# Patient Record
Sex: FEMALE | Race: BLACK OR AFRICAN AMERICAN | Employment: FULL TIME | URBAN - METROPOLITAN AREA
[De-identification: names, ages, dates, MRNs, and addresses within clinical notes are randomized per-mention and may not be internally consistent; named-entity substitution may affect disease eponyms.]

---

## 2017-06-21 PROBLEM — I47.1 SVT (SUPRAVENTRICULAR TACHYCARDIA) (HCC): Status: ACTIVE | Noted: 2017-06-21

## 2017-06-26 ENCOUNTER — OFFICE VISIT (OUTPATIENT)
Dept: CARDIOLOGY CLINIC | Age: 27
End: 2017-06-26

## 2017-06-26 VITALS
OXYGEN SATURATION: 98 % | DIASTOLIC BLOOD PRESSURE: 60 MMHG | BODY MASS INDEX: 29.57 KG/M2 | HEART RATE: 100 BPM | WEIGHT: 184 LBS | SYSTOLIC BLOOD PRESSURE: 140 MMHG | HEIGHT: 66 IN

## 2017-06-26 DIAGNOSIS — I49.1 APC (ATRIAL PREMATURE CONTRACTIONS): ICD-10-CM

## 2017-06-26 DIAGNOSIS — I47.1 SVT (SUPRAVENTRICULAR TACHYCARDIA) (HCC): Primary | ICD-10-CM

## 2017-06-26 NOTE — MR AVS SNAPSHOT
Visit Information Date & Time Provider Department Dept. Phone Encounter #  
 6/26/2017  4:00 PM Vivian Hilliard MD [facility] [phone] Upcoming Health Maintenance Date Due  
 HPV AGE 9Y-34Y (1 of 3 - Female 3 Dose Series) 6/23/2002 Pneumococcal 19-64 Medium Risk (1 of 1 - PPSV23) 6/23/2010 DTaP/Tdap/Td series (1 - Tdap) 6/23/2012 PAP AKA CERVICAL CYTOLOGY 2/12/2017 OB 3RD TRIMESTER TDAP 6/14/2017 INFLUENZA AGE 9 TO ADULT 8/1/2017 Allergies as of 6/26/2017  Review Complete On: 6/21/2017 By: Sita Berrios Severity Noted Reaction Type Reactions Latex  03/07/2014    Rash Azithromycin High 03/07/2014   Systemic Shortness of Breath, Swelling, Angioedema Rocephin [Ceftriaxone] High 06/08/2017    Hives, Rash Peanut Medium 03/07/2014   Systemic Rash, Itching Shellfish Derived Medium 03/07/2014   Systemic Hives, Itching Current Immunizations  Never Reviewed No immunizations on file. Not reviewed this visit You Were Diagnosed With   
  
 Codes Comments SVT (supraventricular tachycardia) ([facility] [address]    -  Primary ICD-10-CM: I47.1 ICD-9-CM: 427.89   
 APC (atrial premature contractions)     ICD-10-CM: I49.1 ICD-9-CM: 427.61 Vitals BP Pulse Height(growth percentile) Weight(growth percentile) SpO2 BMI  
 140/60 100 5' 6\" (1.676 m) 184 lb (83.5 kg) 98% 29.7 kg/m2 OB Status Smoking Status Pregnant Former Smoker BMI and BSA Data Body Mass Index Body Surface Area  
 29.7 kg/m 2 1.97 m 2 Your Updated Medication List  
  
   
This list is accurate as of: 6/26/17  4:40 PM.  Always use your most recent med list.  
  
  
  
  
 acetaminophen 325 mg tablet Commonly known as:  TYLENOL Take 2 Tabs by mouth every four (4) hours as needed. nitrofurantoin (macrocrystal-monohydrate) 100 mg capsule Commonly known as:  MACROBID  
 Take 1 Cap by mouth two (2) times a day. PRENATAL (W/O VIT A)-FE FUM-FA PO Take  by mouth. VITAMIN D2 50,000 unit capsule Generic drug:  ergocalciferol Take 50,000 Units by mouth every seven (7) days. We Performed the Following AMB POC EKG ROUTINE W/ 12 LEADS, INTER & REP [56691 CPT(R)] Introducing [facility]! Ciara Cleverly introduces [employer] patient portal. Now you can access parts of your medical record, email your doctor's office, and request medication refills online. 1. In your internet browser, go to [email or web address] 2. Click on the First Time User? Click Here link in the Sign In box. You will see the New Member Sign Up page. 3. Enter your [employer] Access Code exactly as it appears below. You will not need to use this code after youve completed the sign-up process. If you do not sign up before the expiration date, you must request a new code. · [employer] Access Code: [email or web address] Expires: 9/6/2017  1:55 PM 
 
4. Enter the last four digits of your Social Security Number (xxxx) and Date of Birth (mm/dd/yyyy) as indicated and click Submit. You will be taken to the next sign-up page. 5. Create a [employer] ID. This will be your [employer] login ID and cannot be changed, so think of one that is secure and easy to remember. 6. Create a [employer] password. You can change your password at any time. 7. Enter your Password Reset Question and Answer. This can be used at a later time if you forget your password. 8. Enter your e-mail address. You will receive e-mail notification when new information is available in [address] 9. Click Sign Up. You can now view and download portions of your medical record. 10. Click the Download Summary menu link to download a portable copy of your medical information. If you have questions, please visit the Frequently Asked Questions section of the [employer] website.  Remember, [employer] is NOT to be used for urgent needs. For medical emergencies, dial 911. Now available from your iPhone and Android! Please provide this summary of care documentation to your next provider. Your primary care clinician is listed as JOEL LARIOS. If you have any questions after today's visit, please call [phone].

## 2017-06-26 NOTE — PROGRESS NOTES
HISTORY OF PRESENT ILLNESS  Stefany Park is a 33 y.o. female. Palpitations    Pertinent negatives include no fever, no chest pain, no claudication, no orthopnea, no PND, no abdominal pain, no nausea, no vomiting, no headaches, no dizziness, no cough and no shortness of breath. Patient presents for a post hospital visit. Patient has a past medical history significant for atrial premature contractions and a short NC interval and EKG. The patient underwent a cardiac workup for palpitations over 5 years ago including an echocardiogram and a Holter monitor. The Holter monitor showed frequent atrial premature contractions, but no prolonged arrhythmias. The echocardiogram was essentially normal with the exception of an aneurysmal intra-atrial septum. She then underwent further evaluation of her intra-atrial septum with a bubble study in November 2014 which demonstrated a very small patent foramen ovale. The patient was last seen in the office 8 months ago. She is now 28 weeks pregnant with her second pregnancy. She was hospitalized briefly last week at [facility] for sinus tachycardia and chest pain. She underwent negative PVLs, a  low risk VQ scan and underwent a normal echocardiogram.    Since hospital discharge, the patient still complains of intermittent palpitations. She has had no recurrent chest pain. No shortness of breath, no leg swelling. No severe dizzy spells, syncope or near syncope. Past Medical History:   Diagnosis Date    Asthma     Cardiac echocardiogram, abnormal 11/11/2014    EF 60-65%. No RWMA. Normal diastolic function. L-R septal bowing. Very sm L-R shunt induced by coughing.       Cervical cerclage suture present      in eric    Environmental allergies     Ill-defined condition     mur mur    PACs     heart murmur, whole in heart      Current Outpatient Prescriptions   Medication Sig Dispense Refill    acetaminophen (TYLENOL) 325 mg tablet Take 2 Tabs by mouth every four (4) hours as needed. 30 Tab 1    nitrofurantoin, macrocrystal-monohydrate, (MACROBID) 100 mg capsule Take 1 Cap by mouth two (2) times a day. 20 Cap 0    ergocalciferol (VITAMIN D2) 50,000 unit capsule Take 50,000 Units by mouth every seven (7) days.  PV W-O VIT A/FE FUMARATE/FA (PRENATAL, W/O VIT A,-FE FUM-FA PO) Take  by mouth. Allergies   Allergen Reactions    Latex Rash    Azithromycin Shortness of Breath, Swelling and Angioedema    Rocephin [Ceftriaxone] Hives and Rash    Peanut Rash and Itching    Shellfish Derived Hives and Itching      Social History   Substance Use Topics    Smoking status: Former Smoker     Packs/day: 0.20     Years: 5.00     Types: Cigarettes    Smokeless tobacco: Never Used    Alcohol use No            Review of Systems   Constitutional: Negative for chills, fever and weight loss. HENT: Negative for nosebleeds. Eyes: Negative for blurred vision and double vision. Respiratory: Negative for cough, shortness of breath and wheezing. Cardiovascular: Negative for chest pain, palpitations, orthopnea, claudication, leg swelling and PND. Gastrointestinal: Negative for abdominal pain, heartburn, nausea and vomiting. Genitourinary: Negative for dysuria and hematuria. Musculoskeletal: Negative for falls and myalgias. Skin: Negative for rash. Neurological: Negative for dizziness, focal weakness and headaches. Endo/Heme/Allergies: Does not bruise/bleed easily. Psychiatric/Behavioral: Negative for substance abuse. Visit Vitals    /60    Pulse 100    Ht 5' 6\" (1.676 m)    Wt 83.5 kg (184 lb)    SpO2 98%    BMI 29.7 kg/m2      Physical Exam   Constitutional: She is oriented to person, place, and time. She appears well-developed and well-nourished. HENT:   Head: Normocephalic and atraumatic. Eyes: Conjunctivae are normal.   Neck: Neck supple. No JVD present. Carotid bruit is not present. Cardiovascular: Normal rate, regular rhythm, S1 normal, S2 normal and normal pulses. Exam reveals no gallop. No murmur heard. Split S2   Pulmonary/Chest: Effort normal and breath sounds normal. She has no wheezes. She has no rales. Abdominal: Soft. Bowel sounds are normal. There is no tenderness. Musculoskeletal: She exhibits no edema. Neurological: She is alert and oriented to person, place, and time. Skin: Skin is warm and dry. EKG: Normal sinus rhythm, short SD interval, no delta waves, no ST or T wave abnormalities. No change compared to previous EKG    ASSESSMENT and PLAN    Palpitations. I suspect the patient was having physiologic sinus tachycardia related to her pregnancy. She denied any significant arrhythmias on telemetry during her recent hospital stay. Her EKG demonstrated sinus tachycardia. She underwent a low risk VQ scan, negative PVLs of her legs and an unremarkable echocardiogram.  No further cardiac workup needed for this. Aneurysmal interatrial septum with tiny patent foramen ovale. This was identified by echocardiogram in November 2014. This is an incidental finding and does not portend a worsening prognosis. Antibiotic prophylaxis is not needed for this congenital condition. This should not affect her pregnancy. Atypical chest pain. This is likely musculoskeletal in nature. She is at very low risk for ischemic heart disease. Patient can follow-up in 6 months, sooner if needed.

## 2018-02-05 ENCOUNTER — HOSPITAL ENCOUNTER (EMERGENCY)
Age: 28
Discharge: HOME | End: 2018-02-05
Attending: EMERGENCY MEDICINE | Admitting: EMERGENCY MEDICINE
Payer: COMMERCIAL

## 2018-02-05 ENCOUNTER — OFFICE VISIT (OUTPATIENT)
Dept: CARDIOLOGY CLINIC | Age: 28
End: 2018-02-05

## 2018-02-05 VITALS
WEIGHT: 151.01 LBS | SYSTOLIC BLOOD PRESSURE: 123 MMHG | RESPIRATION RATE: 19 BRPM | HEART RATE: 102 BPM | OXYGEN SATURATION: 99 % | BODY MASS INDEX: 24.27 KG/M2 | DIASTOLIC BLOOD PRESSURE: 77 MMHG | TEMPERATURE: 102 F | HEIGHT: 66 IN

## 2018-02-05 VITALS
SYSTOLIC BLOOD PRESSURE: 96 MMHG | OXYGEN SATURATION: 98 % | HEIGHT: 66 IN | DIASTOLIC BLOOD PRESSURE: 70 MMHG | BODY MASS INDEX: 24.27 KG/M2 | HEART RATE: 108 BPM | WEIGHT: 151 LBS

## 2018-02-05 DIAGNOSIS — R50.9 FEVER IN ADULT: ICD-10-CM

## 2018-02-05 DIAGNOSIS — I49.1 APC (ATRIAL PREMATURE CONTRACTIONS): Primary | ICD-10-CM

## 2018-02-05 DIAGNOSIS — J11.1 FLU SYNDROME: Primary | ICD-10-CM

## 2018-02-05 DIAGNOSIS — I51.7 HYPERTROPHY OF INTER-ATRIAL SEPTUM: ICD-10-CM

## 2018-02-05 PROCEDURE — 99282 EMERGENCY DEPT VISIT SF MDM: CPT

## 2018-02-05 PROCEDURE — 74011250637 HC RX REV CODE- 250/637: Performed by: EMERGENCY MEDICINE

## 2018-02-05 RX ORDER — OSELTAMIVIR PHOSPHATE 75 MG/1
75 CAPSULE ORAL 2 TIMES DAILY
Qty: 10 CAP | Refills: 0 | Status: SHIPPED | OUTPATIENT
Start: 2018-02-05 | End: 2018-02-10

## 2018-02-05 RX ORDER — ACETAMINOPHEN 325 MG/1
650 TABLET ORAL
Status: COMPLETED | OUTPATIENT
Start: 2018-02-05 | End: 2018-02-05

## 2018-02-05 RX ORDER — BENZONATATE 200 MG/1
200 CAPSULE ORAL
Qty: 20 CAP | Refills: 0 | Status: SHIPPED | OUTPATIENT
Start: 2018-02-05 | End: 2018-02-12

## 2018-02-05 RX ORDER — ACETAMINOPHEN 325 MG/1
650 TABLET ORAL
Qty: 20 TAB | Refills: 0 | Status: SHIPPED | OUTPATIENT
Start: 2018-02-05

## 2018-02-05 RX ADMIN — ACETAMINOPHEN 650 MG: 325 TABLET ORAL at 17:14

## 2018-02-05 NOTE — PROGRESS NOTES
HISTORY OF PRESENT ILLNESS  Stefany Francis is a 33 y.o. female. HPI    Patient presents for a follow-up office visit. Patient has a past medical history significant for atrial premature contractions and a short UT interval and EKG. The patient underwent a cardiac workup for palpitations over 5 years ago including an echocardiogram and a Holter monitor. The Holter monitor showed frequent atrial premature contractions, but no prolonged arrhythmias. The echocardiogram was essentially normal with the exception of an aneurysmal intra-atrial septum. She then underwent further evaluation of her intra-atrial septum with a bubble study in November 2014 which demonstrated a very small patent foramen ovale. The patient was last seen in the office 7 months ago. Since last visit, she gave birth to her baby girl. She states she had been doing relatively well up until this week when she started feeling ill with fevers chills cough congestion. She states that her brother had been sick with the flu. Past Medical History:   Diagnosis Date    Asthma     Cardiac echocardiogram, abnormal 11/11/2014    EF 60-65%. No RWMA. Normal diastolic function. L-R septal bowing. Very sm L-R shunt induced by coughing.       Cervical cerclage suture present      in eric    Environmental allergies     Ill-defined condition     mur mur    PACs     heart murmur, whole in heart      Facility-Administered Medications Ordered in Other Visits   Medication Dose Route Frequency Provider Last Rate Last Dose    acetaminophen (TYLENOL) tablet 650 mg  650 mg Oral NOW Lucio English DO           Allergies   Allergen Reactions    Latex Rash    Azithromycin Shortness of Breath, Swelling and Angioedema    Rocephin [Ceftriaxone] Hives and Rash    Peanut Rash and Itching    Shellfish Derived Hives and Itching      Social History   Substance Use Topics    Smoking status: Former Smoker     Packs/day: 0.20     Years: 5.00     Types: Cigarettes    Smokeless tobacco: Never Used    Alcohol use No            Review of Systems   Constitutional: Negative for chills and weight loss. HENT: Negative for nosebleeds. Eyes: Negative for blurred vision and double vision. Respiratory: Negative for wheezing. Cardiovascular: Negative for leg swelling. Gastrointestinal: Negative for heartburn. Genitourinary: Negative for dysuria and hematuria. Musculoskeletal: Negative for falls and myalgias. Skin: Negative for rash. Neurological: Negative for focal weakness. Endo/Heme/Allergies: Does not bruise/bleed easily. Psychiatric/Behavioral: Negative for substance abuse. Visit Vitals    BP 96/70    Pulse (!) 108    Ht 5' 6\" (1.676 m)    Wt 68.5 kg (151 lb)    LMP 01/22/2018    SpO2 98%    BMI 24.37 kg/m2      Physical Exam   Constitutional: She is oriented to person, place, and time. She appears well-developed and well-nourished. HENT:   Head: Normocephalic and atraumatic. Eyes: Conjunctivae are normal.   Neck: Neck supple. No JVD present. Carotid bruit is not present. Cardiovascular: Normal rate, regular rhythm, S1 normal, S2 normal and normal pulses. Exam reveals no gallop. No murmur heard. Split S2   Pulmonary/Chest: Effort normal and breath sounds normal. She has no wheezes. She has no rales. Abdominal: Soft. Bowel sounds are normal. There is no tenderness. Musculoskeletal: She exhibits no edema. Neurological: She is alert and oriented to person, place, and time. Skin: Skin is warm and dry. EKG: Sinus tachycardia, short VT interval, no delta waves, no ST or T wave abnormalities. No change compared to previous EKG    ASSESSMENT and PLAN    Sinus tachycardia. This is likely physiologic. Patient has infectious disease process going on. I have encouraged that she follow-up with her PCP as soon as possible. Aneurysmal interatrial septum with tiny patent foramen ovale.   This was identified by echocardiogram in November 2014. This is an incidental finding and does not portend a worsening prognosis. Antibiotic prophylaxis is not needed for this congenital condition. This should not affect her pregnancy. patient can follow-up as needed.

## 2018-02-05 NOTE — LETTER
[address] [place] [address] [facility] 
[address] [facility] [id number] [phone] Work/School Note Date: 2/5/2018 To Whom It May concern: 
 
Stefany Atkins was seen and treated today in the emergency room by the following provider(s): 
Attending Provider: Mdady Franco DO 
Nurse Practitioner: Juan House NP. Stefany Atkins may return to work on 02/10/2018. Sincerely, Juan House NP

## 2018-02-05 NOTE — ED PROVIDER NOTES
HPI Comments: 4:54 PM   33 y.o. female presents to ED C/O flu like symptoms. Patient has a HX of asthma, environmental allergies, cardiac deformity, PACs, D&C. Patient arrived today with a  CC of sudden onset of flu like symptoms yesterday at work. Patient reports she felt fine prior to going to work yesterday and then she developed headache, nasal congestion, generalized body aches, and fever. patient reports her son had similar symptoms for 4 days before she got sick and he had a fever but she didn't think it could be flu because he had flu shot. Patient did not get flu shot this year. patient reports intermittent nonproductive cough x 1 days, fever, sore throat with cough, generalized headache. Patient denies SOB, CP, abdominal apin, N/V/D. Patient smokes 1/6ppd. Patient denies any other symptoms or complaints. The history is provided by the patient. History limited by: No language barrier. Past Medical History:   Diagnosis Date    Asthma     Cardiac echocardiogram, abnormal 11/11/2014    EF 60-65%. No RWMA. Normal diastolic function. L-R septal bowing. Very sm L-R shunt induced by coughing.  Cervical cerclage suture present      in [facility]    Environmental allergies     Ill-defined condition     mur mur    PACs     heart murmur, whole in heart       Past Surgical History:   Procedure Laterality Date    HX GYN      \"genital warts surgery\"    HX GYN      D&C    HX HEENT      sinus surgery x2         Family History:   Problem Relation Age of Onset    Diabetes Father     Diabetes Maternal Grandmother        Social History     Social History    Marital status: SINGLE     Spouse name: N/A    Number of children: N/A    Years of education: N/A     Occupational History    Not on file.      Social History Main Topics    Smoking status: Former Smoker     Packs/day: 0.20     Years: 5.00     Types: Cigarettes    Smokeless tobacco: Never Used    Alcohol use No    Drug use: No    Sexual activity: Not Currently     Other Topics Concern    Not on file     Social History Narrative         ALLERGIES: Latex; Azithromycin; Rocephin [ceftriaxone]; Peanut; and Shellfish derived    Review of Systems   Constitutional: Positive for fatigue and fever. Negative for appetite change. HENT: Positive for congestion and sore throat. Negative for rhinorrhea. Eyes: Positive for photophobia. Negative for visual disturbance. Respiratory: Positive for cough. Negative for shortness of breath and wheezing. Cardiovascular: Negative for chest pain and leg swelling. Gastrointestinal: Negative for abdominal pain, constipation, diarrhea, nausea and vomiting. Genitourinary: Negative for dysuria. Musculoskeletal: Negative for arthralgias and back pain. Neurological: Positive for headaches. Negative for dizziness and syncope. All other systems reviewed and are negative. Vitals:    02/05/18 1644   BP: 123/77   Pulse: (!) 102   Resp: 19   Temp: (!) 102 °F (38.9 °C)   SpO2: 99%   Weight: 68.5 kg (151 lb 0.2 oz)   Height: 5' 6\" (1.676 m)            Physical Exam   Constitutional: She is oriented to person, place, and time. She appears well-developed and well-nourished. No distress. HENT:   Right Ear: Hearing, tympanic membrane, external ear and ear canal normal.   Left Ear: Hearing, tympanic membrane, external ear and ear canal normal.   Nose: Mucosal edema and rhinorrhea present. Mouth/Throat: Oropharynx is clear and moist and mucous membranes are normal.   Neck: Normal range of motion. Neck supple. Cardiovascular: Normal rate, regular rhythm and intact distal pulses. Murmur heard. Pulmonary/Chest: Effort normal and breath sounds normal. No respiratory distress. She has no wheezes. She has no rales. Musculoskeletal: Normal range of motion. Lymphadenopathy:     She has no cervical adenopathy. Neurological: She is alert and oriented to person, place, and time.  She exhibits normal muscle tone. Coordination normal.   Skin: Skin is warm and dry. No rash noted. She is not diaphoretic. No erythema. No pallor. Nursing note and vitals reviewed. MDM  Number of Diagnoses or Management Options  Fever in adult:   Flu syndrome:   Diagnosis management comments: MDM  Patient's HPI and PE consistent with flu, known contact with sick patient, will treat for flu with tamilflu due to co mobilities, and length of symptoms. Patient given information about symptatic treatment at home. referral to PCP in 3 days for recheck. Patient educated to return to the ED for any new or worsening symptoms - worsening fever, SOB. At time of discharge patient nontoxic appearing, tolerating po fluids, playing with her daughter. Questions denied. ED Course       Procedures             RESULTS:    No orders to display       Labs Reviewed - No data to display    No results found for this or any previous visit (from the past 12 hour(s)). PROGRESS NOTE:   4:54 PM   Initial assessment completed. Written by Juan GRACE    DISCHARGE NOTE:    Stefany Atkins's  results have been reviewed with her. She has been counseled regarding her diagnosis, treatment, and plan. She verbally conveys understanding and agreement of the signs, symptoms, diagnosis, treatment and prognosis and additionally agrees to follow up as discussed. She also agrees with the care-plan and conveys that all of her questions have been answered. I have also provided discharge instructions for her that include: educational information regarding their diagnosis and treatment, and list of reasons why they would want to return to the ED prior to their follow-up appointment, should her condition change. CLINICAL IMPRESSION:    1. Flu syndrome    2.  Fever in adult        AFTER VISIT PLAN:    Discharge Medication List as of 2/5/2018  5:20 PM      START taking these medications    Details   acetaminophen (TYLENOL) 325 mg tablet Take 2 Tabs by mouth every four (4) hours as needed for Pain., Print, Disp-20 Tab, R-0      oseltamivir (TAMIFLU) 75 mg capsule Take 1 Cap by mouth two (2) times a day for 5 days. , Print, Disp-10 Cap, R-0      benzonatate (TESSALON) 200 mg capsule Take 1 Cap by mouth three (3) times daily as needed for Cough for up to 7 days. , Print, Disp-20 Cap, R-0              Follow-up Information     Follow up With Details Comments [address], [place] Schedule an appointment as soon as possible for a visit in 3 days Further evaluation [address] [place] [address]  [phone]             Written by Alida Villegas NP-C

## 2018-02-05 NOTE — LETTER
[address] [place] [address] [facility] 
[address] [facility] [address] [phone] Work/School Note Date: 2/5/2018 To Whom It May concern: 
 
Stefany Atkins was seen and treated today in the emergency room by the following provider(s): 
Attending Provider: Layo Mascorro DO 
Nurse Practitioner: Doug Araya NP. Stefany Atkins may return to work on 02/09/2018. Sincerely, Doug Araya NP

## 2021-08-10 ENCOUNTER — HOSPITAL ENCOUNTER (OUTPATIENT)
Dept: LAB | Age: 31
Discharge: HOME | End: 2021-08-10
Payer: COMMERCIAL

## 2021-08-10 LAB — RUBV IGG SER-IMP: NORMAL

## 2021-08-10 PROCEDURE — 86735 MUMPS ANTIBODY: CPT

## 2021-08-10 PROCEDURE — 86787 VARICELLA-ZOSTER ANTIBODY: CPT

## 2021-08-10 PROCEDURE — 86762 RUBELLA ANTIBODY: CPT

## 2021-08-10 PROCEDURE — 86765 RUBEOLA ANTIBODY: CPT

## 2021-08-11 LAB
MEV IGG SER IA-ACNC: 27.4 AU/ML
MUV IGG SER IA-ACNC: 11.5 AU/ML
VZV IGG SER IA-ACNC: <135 INDEX